# Patient Record
Sex: MALE | Race: WHITE | NOT HISPANIC OR LATINO | URBAN - METROPOLITAN AREA
[De-identification: names, ages, dates, MRNs, and addresses within clinical notes are randomized per-mention and may not be internally consistent; named-entity substitution may affect disease eponyms.]

---

## 2017-05-30 ENCOUNTER — RECORDS - HEALTHEAST (OUTPATIENT)
Dept: LAB | Facility: CLINIC | Age: 57
End: 2017-05-30

## 2017-05-30 LAB
CHOLEST SERPL-MCNC: 198 MG/DL
FASTING STATUS PATIENT QL REPORTED: ABNORMAL
HDLC SERPL-MCNC: 49 MG/DL
LDLC SERPL CALC-MCNC: 96 MG/DL
TRIGL SERPL-MCNC: 265 MG/DL

## 2018-06-27 ENCOUNTER — RECORDS - HEALTHEAST (OUTPATIENT)
Dept: LAB | Facility: CLINIC | Age: 58
End: 2018-06-27

## 2018-06-27 LAB
ANION GAP SERPL CALCULATED.3IONS-SCNC: 7 MMOL/L (ref 5–18)
BUN SERPL-MCNC: 11 MG/DL (ref 8–22)
CALCIUM SERPL-MCNC: 10.2 MG/DL (ref 8.5–10.5)
CHLORIDE BLD-SCNC: 105 MMOL/L (ref 98–107)
CO2 SERPL-SCNC: 26 MMOL/L (ref 22–31)
CREAT SERPL-MCNC: 0.81 MG/DL (ref 0.7–1.3)
GFR SERPL CREATININE-BSD FRML MDRD: >60 ML/MIN/1.73M2
GLUCOSE BLD-MCNC: 111 MG/DL (ref 70–125)
POTASSIUM BLD-SCNC: 4.6 MMOL/L (ref 3.5–5)
SODIUM SERPL-SCNC: 138 MMOL/L (ref 136–145)

## 2018-06-28 LAB — HCV AB SERPL QL IA: NEGATIVE

## 2019-03-13 ENCOUNTER — RECORDS - HEALTHEAST (OUTPATIENT)
Dept: LAB | Facility: CLINIC | Age: 59
End: 2019-03-13

## 2019-03-13 LAB
ALBUMIN SERPL-MCNC: 3.9 G/DL (ref 3.5–5)
ALP SERPL-CCNC: 65 U/L (ref 45–120)
ALT SERPL W P-5'-P-CCNC: 28 U/L (ref 0–45)
ANION GAP SERPL CALCULATED.3IONS-SCNC: 10 MMOL/L (ref 5–18)
AST SERPL W P-5'-P-CCNC: 22 U/L (ref 0–40)
BILIRUB SERPL-MCNC: 0.7 MG/DL (ref 0–1)
BUN SERPL-MCNC: 17 MG/DL (ref 8–22)
CALCIUM SERPL-MCNC: 9.6 MG/DL (ref 8.5–10.5)
CHLORIDE BLD-SCNC: 103 MMOL/L (ref 98–107)
CHOLEST SERPL-MCNC: 218 MG/DL
CO2 SERPL-SCNC: 25 MMOL/L (ref 22–31)
CREAT SERPL-MCNC: 0.98 MG/DL (ref 0.7–1.3)
FASTING STATUS PATIENT QL REPORTED: ABNORMAL
GFR SERPL CREATININE-BSD FRML MDRD: >60 ML/MIN/1.73M2
GLUCOSE BLD-MCNC: 103 MG/DL (ref 70–125)
HDLC SERPL-MCNC: 48 MG/DL
LDLC SERPL CALC-MCNC: 117 MG/DL
POTASSIUM BLD-SCNC: 4.2 MMOL/L (ref 3.5–5)
PROT SERPL-MCNC: 7.2 G/DL (ref 6–8)
PSA SERPL-MCNC: 0.7 NG/ML (ref 0–3.5)
SODIUM SERPL-SCNC: 138 MMOL/L (ref 136–145)
TRIGL SERPL-MCNC: 267 MG/DL

## 2019-08-30 ENCOUNTER — RECORDS - HEALTHEAST (OUTPATIENT)
Dept: LAB | Facility: CLINIC | Age: 59
End: 2019-08-30

## 2019-08-30 LAB — URATE SERPL-MCNC: 8.1 MG/DL (ref 3–8)

## 2019-10-04 ENCOUNTER — RECORDS - HEALTHEAST (OUTPATIENT)
Dept: LAB | Facility: CLINIC | Age: 59
End: 2019-10-04

## 2019-10-04 LAB
POTASSIUM BLD-SCNC: 4.1 MMOL/L (ref 3.5–5)
URATE SERPL-MCNC: 6.7 MG/DL (ref 3–8)

## 2019-12-31 ENCOUNTER — RECORDS - HEALTHEAST (OUTPATIENT)
Dept: LAB | Facility: CLINIC | Age: 59
End: 2019-12-31

## 2019-12-31 LAB — URATE SERPL-MCNC: 5.6 MG/DL (ref 3–8)

## 2021-04-27 ENCOUNTER — RECORDS - HEALTHEAST (OUTPATIENT)
Dept: CARDIOLOGY | Facility: CLINIC | Age: 61
End: 2021-04-27

## 2021-04-27 ENCOUNTER — RECORDS - HEALTHEAST (OUTPATIENT)
Dept: ADMINISTRATIVE | Facility: OTHER | Age: 61
End: 2021-04-27

## 2021-04-27 ENCOUNTER — RECORDS - HEALTHEAST (OUTPATIENT)
Dept: LAB | Facility: CLINIC | Age: 61
End: 2021-04-27

## 2021-04-27 LAB
ALBUMIN SERPL-MCNC: 3.9 G/DL (ref 3.5–5)
ALP SERPL-CCNC: 77 U/L (ref 45–120)
ALT SERPL W P-5'-P-CCNC: 24 U/L (ref 0–45)
ANION GAP SERPL CALCULATED.3IONS-SCNC: 11 MMOL/L (ref 5–18)
AST SERPL W P-5'-P-CCNC: 16 U/L (ref 0–40)
BILIRUB SERPL-MCNC: 0.5 MG/DL (ref 0–1)
BUN SERPL-MCNC: 9 MG/DL (ref 8–22)
CALCIUM SERPL-MCNC: 9.2 MG/DL (ref 8.5–10.5)
CHLORIDE BLD-SCNC: 104 MMOL/L (ref 98–107)
CHOLEST SERPL-MCNC: 190 MG/DL
CO2 SERPL-SCNC: 23 MMOL/L (ref 22–31)
CREAT SERPL-MCNC: 1.12 MG/DL (ref 0.7–1.3)
FASTING STATUS PATIENT QL REPORTED: ABNORMAL
GFR SERPL CREATININE-BSD FRML MDRD: >60 ML/MIN/1.73M2
GLUCOSE BLD-MCNC: 142 MG/DL (ref 70–125)
HDLC SERPL-MCNC: 45 MG/DL
LDLC SERPL CALC-MCNC: 93 MG/DL
POTASSIUM BLD-SCNC: 4.1 MMOL/L (ref 3.5–5)
PROT SERPL-MCNC: 6.8 G/DL (ref 6–8)
SODIUM SERPL-SCNC: 138 MMOL/L (ref 136–145)
TRIGL SERPL-MCNC: 262 MG/DL
TSH SERPL DL<=0.005 MIU/L-ACNC: 2.59 UIU/ML (ref 0.3–5)
URATE SERPL-MCNC: 5.9 MG/DL (ref 3–8)

## 2021-04-28 ENCOUNTER — RECORDS - HEALTHEAST (OUTPATIENT)
Dept: CARDIOLOGY | Facility: CLINIC | Age: 61
End: 2021-04-28

## 2021-04-28 ENCOUNTER — RECORDS - HEALTHEAST (OUTPATIENT)
Dept: ADMINISTRATIVE | Facility: OTHER | Age: 61
End: 2021-04-28

## 2021-04-29 ENCOUNTER — OFFICE VISIT - HEALTHEAST (OUTPATIENT)
Dept: CARDIOLOGY | Facility: CLINIC | Age: 61
End: 2021-04-29

## 2021-04-29 DIAGNOSIS — G47.33 OSA (OBSTRUCTIVE SLEEP APNEA): ICD-10-CM

## 2021-04-29 DIAGNOSIS — I48.91 ATRIAL FIBRILLATION WITH RAPID VENTRICULAR RESPONSE (H): ICD-10-CM

## 2021-04-29 DIAGNOSIS — I10 ESSENTIAL HYPERTENSION, BENIGN: ICD-10-CM

## 2021-04-29 DIAGNOSIS — E66.9 OBESITY: ICD-10-CM

## 2021-04-29 RX ORDER — ALLOPURINOL 100 MG/1
100 TABLET ORAL DAILY
Status: SHIPPED | COMMUNITY
Start: 2021-04-11

## 2021-04-29 RX ORDER — MULTIPLE VITAMINS W/ MINERALS TAB 9MG-400MCG
1 TAB ORAL DAILY
Status: SHIPPED | COMMUNITY
Start: 2021-04-29

## 2021-04-29 RX ORDER — METOPROLOL SUCCINATE 25 MG/1
25 TABLET, EXTENDED RELEASE ORAL DAILY
Qty: 90 TABLET | Refills: 3 | Status: SHIPPED | OUTPATIENT
Start: 2021-04-29 | End: 2022-03-30

## 2021-04-29 RX ORDER — LISINOPRIL 10 MG/1
10 TABLET ORAL DAILY
Qty: 90 TABLET | Refills: 3 | Status: SHIPPED | OUTPATIENT
Start: 2021-04-29

## 2021-04-29 ASSESSMENT — MIFFLIN-ST. JEOR: SCORE: 1772.41

## 2021-05-25 ENCOUNTER — HOSPITAL ENCOUNTER (OUTPATIENT)
Dept: CARDIOLOGY | Facility: CLINIC | Age: 61
Discharge: HOME | End: 2021-05-25
Attending: INTERNAL MEDICINE

## 2021-05-25 DIAGNOSIS — I10 ESSENTIAL HYPERTENSION, BENIGN: ICD-10-CM

## 2021-05-25 DIAGNOSIS — I48.91 ATRIAL FIBRILLATION WITH RAPID VENTRICULAR RESPONSE (H): ICD-10-CM

## 2021-05-25 LAB
AORTIC ROOT: 3.2 CM
AORTIC VALVE MEAN VELOCITY: 93.3 CM/S
ASCENDING AORTA: 3.4 CM
AV DIMENSIONLESS INDEX VTI: 0.9
AV MEAN GRADIENT: 4 MMHG
AV PEAK GRADIENT: 7.6 MMHG
AV VALVE AREA: 3.1 CM2
AV VELOCITY RATIO: 0.9
BSA FOR ECHO PROCEDURE: 2.18 M2
CV BLOOD PRESSURE: ABNORMAL MMHG
CV ECHO HEIGHT: 70 IN
CV ECHO WEIGHT: 213 LBS
DOP CALC AO PEAK VEL: 138 CM/S
DOP CALC AO VTI: 30.8 CM
DOP CALC LVOT AREA: 3.46 CM2
DOP CALC LVOT DIAMETER: 2.1 CM
DOP CALC LVOT PEAK VEL: 129 CM/S
DOP CALC LVOT STROKE VOLUME: 96.9 CM3
DOP CALCLVOT PEAK VEL VTI: 28 CM
EJECTION FRACTION: 66 % (ref 55–75)
FRACTIONAL SHORTENING: 30.4 % (ref 28–44)
INTERVENTRICULAR SEPTUM IN END DIASTOLE: 1.1 CM (ref 0.6–1)
IVS/PW RATIO: 1.3
LA AREA 1: 15 CM2
LA AREA 2: 16 CM2
LEFT ATRIUM LENGTH: 4.9 CM
LEFT ATRIUM SIZE: 4.5 CM
LEFT ATRIUM VOLUME INDEX: 19.1 ML/M2
LEFT ATRIUM VOLUME: 41.6 ML
LEFT VENTRICLE CARDIAC INDEX: 2.7 L/MIN/M2
LEFT VENTRICLE CARDIAC OUTPUT: 5.9 L/MIN
LEFT VENTRICLE DIASTOLIC VOLUME INDEX: 43.5 CM3/M2 (ref 34–74)
LEFT VENTRICLE DIASTOLIC VOLUME: 94.9 CM3 (ref 62–150)
LEFT VENTRICLE HEART RATE: 61 BPM
LEFT VENTRICLE MASS INDEX: 78.8 G/M2
LEFT VENTRICLE SYSTOLIC VOLUME INDEX: 14.8 CM3/M2 (ref 11–31)
LEFT VENTRICLE SYSTOLIC VOLUME: 32.3 CM3 (ref 21–61)
LEFT VENTRICULAR INTERNAL DIMENSION IN DIASTOLE: 4.97 CM (ref 4.2–5.8)
LEFT VENTRICULAR INTERNAL DIMENSION IN SYSTOLE: 3.46 CM (ref 2.5–4)
LEFT VENTRICULAR MASS: 171.8 G
LEFT VENTRICULAR OUTFLOW TRACT MEAN GRADIENT: 3 MMHG
LEFT VENTRICULAR OUTFLOW TRACT MEAN VELOCITY: 83.2 CM/S
LEFT VENTRICULAR OUTFLOW TRACT PEAK GRADIENT: 7 MMHG
LEFT VENTRICULAR POSTERIOR WALL IN END DIASTOLE: 0.83 CM (ref 0.6–1)
LV STROKE VOLUME INDEX: 44.5 ML/M2
MITRAL VALVE DECELERATION SLOPE: 3030 MM/S2
MITRAL VALVE E/A RATIO: 1.2
MITRAL VALVE PRESSURE HALF-TIME: 68 MS
MV AVERAGE E/E' RATIO: 7.9 CM/S
MV DECELERATION TIME: 232 MS
MV E'TISSUE VEL-LAT: 9.48 CM/S
MV E'TISSUE VEL-MED: 8.41 CM/S
MV LATERAL E/E' RATIO: 7.4
MV MEDIAL E/E' RATIO: 8.4
MV PEAK A VELOCITY: 57.7 CM/S
MV PEAK E VELOCITY: 70.3 CM/S
MV VALVE AREA PRESSURE 1/2 METHOD: 3.2 CM2
NUC REST DIASTOLIC VOLUME INDEX: 3408 LBS
NUC REST SYSTOLIC VOLUME INDEX: 70 IN
PV ACCELERATION TIME: 148 MS
TRICUSPID VALVE ANULAR PLANE SYSTOLIC EXCURSION: 2.4 CM

## 2021-05-25 ASSESSMENT — MIFFLIN-ST. JEOR: SCORE: 1772.41

## 2021-06-05 VITALS
SYSTOLIC BLOOD PRESSURE: 128 MMHG | BODY MASS INDEX: 30.49 KG/M2 | WEIGHT: 213 LBS | RESPIRATION RATE: 18 BRPM | HEIGHT: 70 IN | HEART RATE: 84 BPM | DIASTOLIC BLOOD PRESSURE: 70 MMHG

## 2021-06-16 PROBLEM — E66.9 OBESITY: Status: ACTIVE | Noted: 2021-04-29

## 2021-06-16 PROBLEM — I10 ESSENTIAL HYPERTENSION, BENIGN: Status: ACTIVE | Noted: 2021-04-29

## 2021-06-16 PROBLEM — G47.33 OSA (OBSTRUCTIVE SLEEP APNEA): Status: ACTIVE | Noted: 2021-04-29

## 2021-06-16 PROBLEM — I48.91 ATRIAL FIBRILLATION WITH RAPID VENTRICULAR RESPONSE (H): Status: ACTIVE | Noted: 2021-04-29

## 2021-06-17 NOTE — PATIENT INSTRUCTIONS - HE
1. Limit your alcohol intake to decrease the risk of recurrent bouts of atrial fibrillation.  2. We will schedule an echocardiogram to look for evidence of structural heart disease.  3. Decrease lisinopril to 10 mg daily.  4. Start metoprolol succinate 25 mg daily to help with blood pressure control, as well as help control recurrent bouts of atrial fibrillation.  5. Check your pulse on a daily basis to look for evidence of recurrent bouts of atrial fibrillation.  6. Target at least 150 minutes of moderate aerobic exercise on a weekly basis.

## 2021-06-30 NOTE — PROGRESS NOTES
Progress Notes by Zbigniew Huffman MD at 4/29/2021  9:50 AM     Author: Zbigniew Huffman MD Service: -- Author Type: Physician    Filed: 4/29/2021 10:56 AM Encounter Date: 4/29/2021 Status: Signed    : Zbigniew Huffman MD (Physician)       CARDIOLOGY Rapid Access CLINIC CONSULT NOTE     Assessment/Plan:   1. Atrial fibrillation with rapid ventricular response.  Paroxysmal episode new onset possibly related to increased alcohol intake.  We discussed at length the pathophysiology of atrial fibrillation.  We discussed that his risks for recurrence include his alcohol intake as well as sleep apnea.  His [id number]-[id number] score is 1, suggesting a low risk of stroke associated with atrial fibrillation.  This does not warrant long-term anticoagulation at this time.  We discussed strategies to prevent recurrent bouts of atrial fibrillation as well as to keep the heart rate under control if it occurs.  We will plan to start metoprolol 25 mg daily as a rate control strategy, he will continue aspirin 81 mg daily, and we will check an echocardiogram to look for evidence of structural heart disease.  He will work on decreasing his alcohol intake, resuming regular exercise, and monitoring for effective treatment of his sleep apnea.  2. Essential hypertension.  Will decrease lisinopril from 20 mg daily to 10 mg daily, to avoid hypotension associated with initiation of low-dose metoprolol.    Follow up as needed or for abnormalities on echocardiography     History of Present Illness:     It is my pleasure to see Sidney ACEVEDO Chiqui at the [facility] clinic for evaluation of new onset atrial fibrillation.    Sidney ACEVEDO Chiqui is a 61 y.o. male with a past medical history of hypertension, obesity, obstructive sleep apnea for which he uses an oral appliance.  He has no history of stroke or heart failure or vascular disease.  There is no history of diabetes mellitus.  His [id number]-AACu score is  1.  He celebrated his birthday last week with a few more beers than usual.  The following day  He was going to donate blood which he has done often (11 gallons worth) when he was noticed to have an irregular heartbeat.  He checked in with his primary care provider and was found to be in atrial fibrillation 123 beats a minute.  He then began to check his pulse and was aware of the irregularities in his pulse.  Yesterday the pulse again became regular and he has not noticed any regularity since then.  He had felt some vague congestion over the last week or 2 which he suspected to be related to seasonal allergies.  He still has some sinus congestion today though he has not noticed any aware as of irregularities of his pulse.  He also noticed some mild lightheadedness with standing over the last 1 or 2 weeks which has also resolved over the last 24 hours.      Past Medical History:   There is no problem list on file for this patient.      Past Surgical History:   No past surgical history on file.    Family History:   No family history on file.  Family history reviewed and is not pertinent to the chief complaint or presenting problem    Social History:    reports that he has never smoked. He has never used smokeless tobacco.   Drinks 2-3 beers most days and once or twice a week will drink up to 6 beers.  Retired strength training     Exercise: Rides a road bicycle during the summer months, uses a  during the winter months plus walks 2 miles on a daily basis.  Stable stamina.    Sleep: Restorative on oral appliance    Meds:     Current Outpatient Medications on File Prior to Visit   Medication Sig Dispense Refill   ? allopurinoL (ZYLOPRIM) 100 MG tablet Take 100 mg by mouth daily.      ? aspirin 81 MG EC tablet Take 81 mg by mouth daily.     ? lisinopriL (PRINIVIL,ZESTRIL) 20 MG tablet Take 20 mg by mouth daily.     ? multivitamin with minerals (THERA-M) 9 mg iron-400 mcg Tab tablet Take 1 tablet by mouth  "daily.       No current facility-administered medications on file prior to visit.        Allergies:   Patient has no known allergies.    Review of Systems:     General: Weight Loss  Eyes: WNL  Ears/Nose/Throat: WNL  Lungs: WNL  Heart: Irregular Heartbeat  Stomach: WNL  Bladder: WNL  Muscle/Joints: WNL  Skin: WNL  Nervous System: WNL  Mental Health: WNL     Blood: WNL        Objective:      Physical Exam  213 lb (96.6 kg)  5' 10\" (1.778 m)  Body mass index is 30.56 kg/m .  /70 (Patient Site: Left Arm, Patient Position: Sitting, Cuff Size: Adult Regular)   Pulse 84   Resp 18   Ht 5' 10\" (1.778 m)   Wt 213 lb (96.6 kg)   BMI 30.56 kg/m      General Appearance : Awake, Alert, No acute distress  HEENT: No Scleral icterus; the mucous membranes were pink and moist.  Conjunctivae slightly injected  Neck:  No cervical bruits, jugular venous distention, or thyromegaly   Chest: The spine was straight. Chest wall symmetric  Lungs: Respirations unlabored; the lungs are clear to auscultation.  No wheezing   Cardiovascular:   Normal point of maximal impulse.  Auscultation reveals normal first and second heart sounds with no murmurs, rubs, or gallops.  Carotid, radial, and dorsalis pedal pulses are intact and symmetric.    Abdomen: Rounded.  No organomegaly, masses, bruits, or tenderness. Bowels sounds are present  Extremities: No edema  Skin: No xanthelasma. Warm, Dry.  Musculoskeletal: No tenderness.  Neurologic: Alert and oriented ×3. Speech is fluent.      EKG(personally reviewed):  4/27/2021: Atrial fibrillation with rapid ventricular response at 123 bpm.  Low voltage limb leads.  No previous study available for comparison.    Cardiac Imaging Studies:      Lab Review   Lab Results   Component Value Date     04/27/2021    K 4.1 04/27/2021     04/27/2021    CO2 23 04/27/2021    BUN 9 04/27/2021    CREATININE 1.12 04/27/2021    CALCIUM 9.2 04/27/2021     No results found for: WBC, HGB, HCT, MCV, PLT  Lab " Results   Component Value Date    CHOL 190 04/27/2021    TRIG 262 (H) 04/27/2021    HDL 45 04/27/2021    LDLCALC 93 04/27/2021     No results found for: TROPONINI  No results found for: BNP  Lab Results   Component Value Date    TSH 2.59 04/27/2021       Zbigniew Huffman MD [facility]      His note created using Dragon voice recognition software. Sound alike errors may have escaped editing.

## 2021-07-06 VITALS — WEIGHT: 213 LBS | BODY MASS INDEX: 30.49 KG/M2 | HEIGHT: 70 IN

## 2021-12-01 ENCOUNTER — LAB REQUISITION (OUTPATIENT)
Dept: LAB | Facility: CLINIC | Age: 61
End: 2021-12-01

## 2021-12-01 DIAGNOSIS — R42 DIZZINESS AND GIDDINESS: ICD-10-CM

## 2021-12-01 LAB
ANION GAP SERPL CALCULATED.3IONS-SCNC: 9 MMOL/L (ref 5–18)
BUN SERPL-MCNC: 10 MG/DL (ref 8–22)
CALCIUM SERPL-MCNC: 9.6 MG/DL (ref 8.5–10.5)
CHLORIDE BLD-SCNC: 105 MMOL/L (ref 98–107)
CO2 SERPL-SCNC: 22 MMOL/L (ref 22–31)
CREAT SERPL-MCNC: 0.91 MG/DL (ref 0.7–1.3)
GFR SERPL CREATININE-BSD FRML MDRD: 90 ML/MIN/1.73M2
GLUCOSE BLD-MCNC: 107 MG/DL (ref 70–125)
POTASSIUM BLD-SCNC: 4.5 MMOL/L (ref 3.5–5)
SODIUM SERPL-SCNC: 136 MMOL/L (ref 136–145)

## 2021-12-01 PROCEDURE — 80048 BASIC METABOLIC PNL TOTAL CA: CPT | Performed by: NURSE PRACTITIONER

## 2023-03-21 ENCOUNTER — LAB REQUISITION (OUTPATIENT)
Dept: LAB | Facility: CLINIC | Age: 63
End: 2023-03-21

## 2023-03-21 DIAGNOSIS — M1A.0710 IDIOPATHIC CHRONIC GOUT, RIGHT ANKLE AND FOOT, WITHOUT TOPHUS (TOPHI): ICD-10-CM

## 2023-03-21 DIAGNOSIS — I10 ESSENTIAL (PRIMARY) HYPERTENSION: ICD-10-CM

## 2023-03-21 DIAGNOSIS — E78.2 MIXED HYPERLIPIDEMIA: ICD-10-CM

## 2023-03-21 LAB
ANION GAP SERPL CALCULATED.3IONS-SCNC: 11 MMOL/L (ref 7–15)
BUN SERPL-MCNC: 13.5 MG/DL (ref 8–23)
CALCIUM SERPL-MCNC: 9.8 MG/DL (ref 8.8–10.2)
CHLORIDE SERPL-SCNC: 103 MMOL/L (ref 98–107)
CHOLEST SERPL-MCNC: 205 MG/DL
CREAT SERPL-MCNC: 1.03 MG/DL (ref 0.67–1.17)
DEPRECATED HCO3 PLAS-SCNC: 23 MMOL/L (ref 22–29)
GFR SERPL CREATININE-BSD FRML MDRD: 82 ML/MIN/1.73M2
GLUCOSE SERPL-MCNC: 114 MG/DL (ref 70–99)
HDLC SERPL-MCNC: 48 MG/DL
LDLC SERPL CALC-MCNC: 114 MG/DL
NONHDLC SERPL-MCNC: 157 MG/DL
POTASSIUM SERPL-SCNC: 4.9 MMOL/L (ref 3.4–5.3)
SODIUM SERPL-SCNC: 137 MMOL/L (ref 136–145)
TRIGL SERPL-MCNC: 214 MG/DL
URATE SERPL-MCNC: 5.5 MG/DL (ref 3.4–7)

## 2023-03-21 PROCEDURE — 84550 ASSAY OF BLOOD/URIC ACID: CPT | Performed by: NURSE PRACTITIONER

## 2023-03-21 PROCEDURE — 80061 LIPID PANEL: CPT | Performed by: NURSE PRACTITIONER

## 2023-03-21 PROCEDURE — 80048 BASIC METABOLIC PNL TOTAL CA: CPT | Performed by: NURSE PRACTITIONER

## 2024-05-01 ENCOUNTER — LAB REQUISITION (OUTPATIENT)
Dept: LAB | Facility: CLINIC | Age: 64
End: 2024-05-01

## 2024-05-01 DIAGNOSIS — E78.2 MIXED HYPERLIPIDEMIA: ICD-10-CM

## 2024-05-01 DIAGNOSIS — I10 ESSENTIAL (PRIMARY) HYPERTENSION: ICD-10-CM

## 2024-05-01 DIAGNOSIS — Z12.5 ENCOUNTER FOR SCREENING FOR MALIGNANT NEOPLASM OF PROSTATE: ICD-10-CM

## 2024-05-01 DIAGNOSIS — M1A.0710 IDIOPATHIC CHRONIC GOUT, RIGHT ANKLE AND FOOT, WITHOUT TOPHUS (TOPHI): ICD-10-CM

## 2024-05-01 PROCEDURE — 80061 LIPID PANEL: CPT | Performed by: NURSE PRACTITIONER

## 2024-05-01 PROCEDURE — 84550 ASSAY OF BLOOD/URIC ACID: CPT | Performed by: NURSE PRACTITIONER

## 2024-05-01 PROCEDURE — 80053 COMPREHEN METABOLIC PANEL: CPT | Performed by: NURSE PRACTITIONER

## 2024-05-01 PROCEDURE — G0103 PSA SCREENING: HCPCS | Performed by: NURSE PRACTITIONER

## 2024-05-03 LAB
ALBUMIN SERPL BCG-MCNC: 4.4 G/DL (ref 3.5–5.2)
ALP SERPL-CCNC: 65 U/L (ref 40–150)
ALT SERPL W P-5'-P-CCNC: 29 U/L (ref 0–70)
ANION GAP SERPL CALCULATED.3IONS-SCNC: 13 MMOL/L (ref 7–15)
AST SERPL W P-5'-P-CCNC: 25 U/L (ref 0–45)
BILIRUB SERPL-MCNC: 0.4 MG/DL
BUN SERPL-MCNC: 15.2 MG/DL (ref 8–23)
CALCIUM SERPL-MCNC: 9.1 MG/DL (ref 8.8–10.2)
CHLORIDE SERPL-SCNC: 102 MMOL/L (ref 98–107)
CHOLEST SERPL-MCNC: 211 MG/DL
CREAT SERPL-MCNC: 0.99 MG/DL (ref 0.67–1.17)
DEPRECATED HCO3 PLAS-SCNC: 21 MMOL/L (ref 22–29)
EGFRCR SERPLBLD CKD-EPI 2021: 85 ML/MIN/1.73M2
FASTING STATUS PATIENT QL REPORTED: ABNORMAL
GLUCOSE SERPL-MCNC: 96 MG/DL (ref 70–99)
HDLC SERPL-MCNC: 40 MG/DL
LDLC SERPL CALC-MCNC: 131 MG/DL
NONHDLC SERPL-MCNC: 171 MG/DL
POTASSIUM SERPL-SCNC: 4 MMOL/L (ref 3.4–5.3)
PROT SERPL-MCNC: 7.3 G/DL (ref 6.4–8.3)
PSA SERPL DL<=0.01 NG/ML-MCNC: 0.39 NG/ML (ref 0–4.5)
SODIUM SERPL-SCNC: 136 MMOL/L (ref 135–145)
TRIGL SERPL-MCNC: 202 MG/DL
URATE SERPL-MCNC: 5.7 MG/DL (ref 3.4–7)